# Patient Record
Sex: FEMALE | Race: WHITE | NOT HISPANIC OR LATINO | Employment: OTHER | URBAN - METROPOLITAN AREA
[De-identification: names, ages, dates, MRNs, and addresses within clinical notes are randomized per-mention and may not be internally consistent; named-entity substitution may affect disease eponyms.]

---

## 2023-02-13 PROBLEM — J22 ACUTE RESPIRATORY INFECTION: Status: ACTIVE | Noted: 2023-02-13

## 2023-02-13 PROBLEM — E03.9 HYPOTHYROIDISM: Status: ACTIVE | Noted: 2023-02-13

## 2023-02-13 PROBLEM — H52.31 ANISOMETROPIA: Status: ACTIVE | Noted: 2023-02-13

## 2023-02-13 PROBLEM — M16.12 PRIMARY OSTEOARTHRITIS OF LEFT HIP: Status: ACTIVE | Noted: 2023-02-13

## 2023-02-13 PROBLEM — H52.13 BILATERAL MYOPIA: Status: ACTIVE | Noted: 2023-02-13

## 2023-02-13 PROBLEM — I10 BENIGN ESSENTIAL HYPERTENSION: Status: ACTIVE | Noted: 2023-02-13

## 2023-02-13 PROBLEM — N60.09 BREAST CYST: Status: ACTIVE | Noted: 2023-02-13

## 2023-02-13 PROBLEM — R20.2 NUMBNESS AND TINGLING IN LEFT ARM: Status: ACTIVE | Noted: 2023-02-13

## 2023-02-13 PROBLEM — H92.09 OTALGIA: Status: ACTIVE | Noted: 2023-02-13

## 2023-02-13 PROBLEM — R92.8 MAMMOGRAM ABNORMAL: Status: ACTIVE | Noted: 2023-02-13

## 2023-02-13 PROBLEM — H52.203 ASTIGMATISM, BILATERAL: Status: ACTIVE | Noted: 2023-02-13

## 2023-02-13 PROBLEM — D58.2 ELEVATED HEMOGLOBIN (CMS-HCC): Status: ACTIVE | Noted: 2023-02-13

## 2023-02-13 PROBLEM — M25.552 LEFT HIP PAIN: Status: ACTIVE | Noted: 2023-02-13

## 2023-02-13 PROBLEM — N39.0 ACUTE UTI: Status: ACTIVE | Noted: 2023-02-13

## 2023-02-13 PROBLEM — E78.5 HYPERLIPIDEMIA: Status: ACTIVE | Noted: 2023-02-13

## 2023-02-13 PROBLEM — H43.813 PVD (POSTERIOR VITREOUS DETACHMENT), BOTH EYES: Status: ACTIVE | Noted: 2023-02-13

## 2023-02-13 PROBLEM — H52.4 BILATERAL PRESBYOPIA: Status: ACTIVE | Noted: 2023-02-13

## 2023-02-13 PROBLEM — R20.0 NUMBNESS AND TINGLING IN LEFT ARM: Status: ACTIVE | Noted: 2023-02-13

## 2023-02-13 PROBLEM — H25.13 NUCLEAR SCLEROSIS OF BOTH EYES: Status: ACTIVE | Noted: 2023-02-13

## 2023-02-13 PROBLEM — R71.8 ELEVATED HEMATOCRIT: Status: ACTIVE | Noted: 2023-02-13

## 2023-02-13 PROBLEM — M85.80 OSTEOPENIA: Status: ACTIVE | Noted: 2023-02-13

## 2023-02-13 RX ORDER — ATORVASTATIN CALCIUM 20 MG/1
1 TABLET, FILM COATED ORAL DAILY
COMMUNITY
Start: 2017-05-19 | End: 2023-03-09 | Stop reason: SDUPTHER

## 2023-02-13 RX ORDER — HYDROCHLOROTHIAZIDE 50 MG/1
1 TABLET ORAL DAILY
COMMUNITY
Start: 2013-01-30 | End: 2023-03-24 | Stop reason: SDUPTHER

## 2023-02-13 RX ORDER — ASPIRIN 81 MG/1
81 TABLET ORAL DAILY
COMMUNITY
Start: 2021-11-29 | End: 2023-03-24

## 2023-02-13 RX ORDER — LEVOTHYROXINE SODIUM 75 UG/1
1 TABLET ORAL DAILY
COMMUNITY
Start: 2013-05-09 | End: 2023-03-24 | Stop reason: SDUPTHER

## 2023-02-13 RX ORDER — GLUCOSAM/CHONDRO/HERB 149/HYAL 750-100 MG
1 TABLET ORAL 2 TIMES DAILY
COMMUNITY
End: 2023-03-24

## 2023-02-13 RX ORDER — LISINOPRIL 20 MG/1
1 TABLET ORAL DAILY
COMMUNITY
Start: 2020-01-06 | End: 2023-03-24 | Stop reason: SDUPTHER

## 2023-03-09 ENCOUNTER — TELEPHONE (OUTPATIENT)
Dept: PRIMARY CARE | Facility: CLINIC | Age: 68
End: 2023-03-09
Payer: MEDICARE

## 2023-03-09 DIAGNOSIS — E78.5 HYPERLIPIDEMIA, UNSPECIFIED HYPERLIPIDEMIA TYPE: Primary | ICD-10-CM

## 2023-03-09 RX ORDER — NITROFURANTOIN 25; 75 MG/1; MG/1
100 CAPSULE ORAL EVERY 12 HOURS
COMMUNITY
Start: 2022-11-21 | End: 2023-03-24 | Stop reason: ALTCHOICE

## 2023-03-09 RX ORDER — ATORVASTATIN CALCIUM 20 MG/1
20 TABLET, FILM COATED ORAL DAILY
Qty: 90 TABLET | Refills: 3 | Status: SHIPPED | OUTPATIENT
Start: 2023-03-09 | End: 2023-03-24 | Stop reason: SDUPTHER

## 2023-03-21 ENCOUNTER — LAB (OUTPATIENT)
Dept: LAB | Facility: LAB | Age: 68
End: 2023-03-21
Payer: MEDICARE

## 2023-03-21 DIAGNOSIS — E78.5 HYPERLIPIDEMIA, UNSPECIFIED HYPERLIPIDEMIA TYPE: ICD-10-CM

## 2023-03-21 LAB
CHOLESTEROL (MG/DL) IN SER/PLAS: 220 MG/DL (ref 0–199)
CHOLESTEROL IN HDL (MG/DL) IN SER/PLAS: 44.2 MG/DL
CHOLESTEROL/HDL RATIO: 5
LDL: 103 MG/DL (ref 0–99)
NON HDL CHOLESTEROL: 176 MG/DL
TRIGLYCERIDE (MG/DL) IN SER/PLAS: 362 MG/DL (ref 0–149)
VLDL: 72 MG/DL (ref 0–40)

## 2023-03-21 PROCEDURE — 36415 COLL VENOUS BLD VENIPUNCTURE: CPT

## 2023-03-21 PROCEDURE — 80061 LIPID PANEL: CPT

## 2023-03-24 ENCOUNTER — OFFICE VISIT (OUTPATIENT)
Dept: PRIMARY CARE | Facility: CLINIC | Age: 68
End: 2023-03-24
Payer: MEDICARE

## 2023-03-24 VITALS
WEIGHT: 135 LBS | HEIGHT: 64 IN | SYSTOLIC BLOOD PRESSURE: 114 MMHG | BODY MASS INDEX: 23.05 KG/M2 | OXYGEN SATURATION: 95 % | TEMPERATURE: 97.3 F | DIASTOLIC BLOOD PRESSURE: 79 MMHG | HEART RATE: 99 BPM

## 2023-03-24 DIAGNOSIS — E03.9 ACQUIRED HYPOTHYROIDISM: ICD-10-CM

## 2023-03-24 DIAGNOSIS — I10 BENIGN ESSENTIAL HYPERTENSION: Primary | ICD-10-CM

## 2023-03-24 DIAGNOSIS — M85.80 OSTEOPENIA, UNSPECIFIED LOCATION: ICD-10-CM

## 2023-03-24 DIAGNOSIS — E78.5 HYPERLIPIDEMIA, UNSPECIFIED HYPERLIPIDEMIA TYPE: ICD-10-CM

## 2023-03-24 DIAGNOSIS — D58.2 ELEVATED HEMOGLOBIN (CMS-HCC): ICD-10-CM

## 2023-03-24 PROCEDURE — 3074F SYST BP LT 130 MM HG: CPT | Performed by: INTERNAL MEDICINE

## 2023-03-24 PROCEDURE — 1159F MED LIST DOCD IN RCRD: CPT | Performed by: INTERNAL MEDICINE

## 2023-03-24 PROCEDURE — 1160F RVW MEDS BY RX/DR IN RCRD: CPT | Performed by: INTERNAL MEDICINE

## 2023-03-24 PROCEDURE — 99214 OFFICE O/P EST MOD 30 MIN: CPT | Performed by: INTERNAL MEDICINE

## 2023-03-24 PROCEDURE — 1036F TOBACCO NON-USER: CPT | Performed by: INTERNAL MEDICINE

## 2023-03-24 PROCEDURE — 3078F DIAST BP <80 MM HG: CPT | Performed by: INTERNAL MEDICINE

## 2023-03-24 RX ORDER — HYDROCHLOROTHIAZIDE 50 MG/1
50 TABLET ORAL DAILY
Qty: 90 TABLET | Refills: 3 | Status: SHIPPED | OUTPATIENT
Start: 2023-03-24 | End: 2024-02-26

## 2023-03-24 RX ORDER — LISINOPRIL 20 MG/1
20 TABLET ORAL DAILY
Qty: 90 TABLET | Refills: 3 | Status: SHIPPED | OUTPATIENT
Start: 2023-03-24 | End: 2024-02-26

## 2023-03-24 RX ORDER — LEVOTHYROXINE SODIUM 75 UG/1
75 TABLET ORAL
Qty: 90 TABLET | Refills: 3 | Status: SHIPPED | OUTPATIENT
Start: 2023-03-24 | End: 2024-02-26

## 2023-03-24 RX ORDER — ATORVASTATIN CALCIUM 20 MG/1
20 TABLET, FILM COATED ORAL DAILY
Qty: 90 TABLET | Refills: 3 | Status: SHIPPED | OUTPATIENT
Start: 2023-03-24 | End: 2024-02-26

## 2023-03-24 ASSESSMENT — PATIENT HEALTH QUESTIONNAIRE - PHQ9
2. FEELING DOWN, DEPRESSED OR HOPELESS: NOT AT ALL
SUM OF ALL RESPONSES TO PHQ9 QUESTIONS 1 AND 2: 0
1. LITTLE INTEREST OR PLEASURE IN DOING THINGS: NOT AT ALL

## 2023-03-24 NOTE — PROGRESS NOTES
"Subjective   Patient ID: Leny Dumont is a 67 y.o. female who presents for Follow-up.    here to F/u on medical problems HTN,HLD,hypothyroid,elevated glucose ,hemoglobin(seen by hematologist Dr Montes),she takes meds regularly,no side effects.  Her to go over her lab results,she has been watching her diet  She broke her left ankle in past,s/p surgery.           Review of Systems   Constitutional: Negative.    HENT: Negative.     Eyes: Negative.    Respiratory: Negative.     Cardiovascular: Negative.    Gastrointestinal: Negative.    Genitourinary: Negative.    Neurological: Negative.    Hematological: Negative.    Psychiatric/Behavioral: Negative.         Objective   /79 (BP Location: Right arm, Patient Position: Sitting, BP Cuff Size: Adult)   Pulse 99   Temp 36.3 °C (97.3 °F) (Temporal)   Ht 1.613 m (5' 3.5\")   Wt 61.2 kg (135 lb)   SpO2 95%   BMI 23.54 kg/m²     Physical Exam  Vitals reviewed.   Constitutional:       Appearance: Normal appearance.   HENT:      Head: Normocephalic and atraumatic.      Right Ear: Tympanic membrane and ear canal normal.      Left Ear: Tympanic membrane and ear canal normal.   Eyes:      Extraocular Movements: Extraocular movements intact.      Pupils: Pupils are equal, round, and reactive to light.   Cardiovascular:      Rate and Rhythm: Normal rate and regular rhythm.      Pulses: Normal pulses.      Heart sounds: Normal heart sounds. No murmur heard.  Pulmonary:      Effort: Pulmonary effort is normal.      Breath sounds: Normal breath sounds.   Abdominal:      General: Abdomen is flat. Bowel sounds are normal.      Palpations: Abdomen is soft.   Musculoskeletal:         General: Normal range of motion.      Cervical back: Normal range of motion and neck supple.   Neurological:      General: No focal deficit present.      Mental Status: She is alert and oriented to person, place, and time.   Psychiatric:         Mood and Affect: Mood normal.         Assessment/Plan "

## 2023-03-26 PROBLEM — N39.0 ACUTE UTI: Status: RESOLVED | Noted: 2023-02-13 | Resolved: 2023-03-26

## 2023-03-26 PROBLEM — J22 ACUTE RESPIRATORY INFECTION: Status: RESOLVED | Noted: 2023-02-13 | Resolved: 2023-03-26

## 2023-03-26 PROBLEM — H92.09 OTALGIA: Status: RESOLVED | Noted: 2023-02-13 | Resolved: 2023-03-26

## 2023-03-26 ASSESSMENT — ENCOUNTER SYMPTOMS
CARDIOVASCULAR NEGATIVE: 1
EYES NEGATIVE: 1
PSYCHIATRIC NEGATIVE: 1
HEMATOLOGIC/LYMPHATIC NEGATIVE: 1
RESPIRATORY NEGATIVE: 1
NEUROLOGICAL NEGATIVE: 1
CONSTITUTIONAL NEGATIVE: 1
GASTROINTESTINAL NEGATIVE: 1

## 2023-03-26 NOTE — PATIENT INSTRUCTIONS
Continue same meds.  Return for folow up in 4 months for wellness.  Order labs.  Stable medical conditions.

## 2023-07-18 PROBLEM — M25.672 DECREASED RANGE OF MOTION OF LEFT ANKLE: Status: ACTIVE | Noted: 2021-11-30

## 2023-07-18 PROBLEM — R26.9 GAIT ABNORMALITY: Status: ACTIVE | Noted: 2021-11-30

## 2023-07-18 PROBLEM — I10 HTN (HYPERTENSION): Status: ACTIVE | Noted: 2021-11-04

## 2023-07-18 PROBLEM — S82.852A CLOSED TRIMALLEOLAR FRACTURE OF LEFT ANKLE: Status: ACTIVE | Noted: 2021-11-04

## 2023-07-21 ENCOUNTER — LAB (OUTPATIENT)
Dept: LAB | Facility: LAB | Age: 68
End: 2023-07-21
Payer: MEDICARE

## 2023-07-21 DIAGNOSIS — I10 BENIGN ESSENTIAL HYPERTENSION: ICD-10-CM

## 2023-07-21 DIAGNOSIS — E78.5 HYPERLIPIDEMIA, UNSPECIFIED HYPERLIPIDEMIA TYPE: ICD-10-CM

## 2023-07-21 LAB
ALANINE AMINOTRANSFERASE (SGPT) (U/L) IN SER/PLAS: 21 U/L (ref 7–45)
ALBUMIN (G/DL) IN SER/PLAS: 4.5 G/DL (ref 3.4–5)
ALKALINE PHOSPHATASE (U/L) IN SER/PLAS: 87 U/L (ref 33–136)
ANION GAP IN SER/PLAS: 14 MMOL/L (ref 10–20)
ASPARTATE AMINOTRANSFERASE (SGOT) (U/L) IN SER/PLAS: 22 U/L (ref 9–39)
BASOPHILS (10*3/UL) IN BLOOD BY AUTOMATED COUNT: 0.04 X10E9/L (ref 0–0.1)
BASOPHILS/100 LEUKOCYTES IN BLOOD BY AUTOMATED COUNT: 0.6 % (ref 0–2)
BILIRUBIN TOTAL (MG/DL) IN SER/PLAS: 0.9 MG/DL (ref 0–1.2)
CALCIUM (MG/DL) IN SER/PLAS: 10.3 MG/DL (ref 8.6–10.3)
CARBON DIOXIDE, TOTAL (MMOL/L) IN SER/PLAS: 30 MMOL/L (ref 21–32)
CHLORIDE (MMOL/L) IN SER/PLAS: 97 MMOL/L (ref 98–107)
CHOLESTEROL (MG/DL) IN SER/PLAS: 223 MG/DL (ref 0–199)
CHOLESTEROL IN HDL (MG/DL) IN SER/PLAS: 51.6 MG/DL
CHOLESTEROL/HDL RATIO: 4.3
CREATININE (MG/DL) IN SER/PLAS: 0.68 MG/DL (ref 0.5–1.05)
EOSINOPHILS (10*3/UL) IN BLOOD BY AUTOMATED COUNT: 0.1 X10E9/L (ref 0–0.7)
EOSINOPHILS/100 LEUKOCYTES IN BLOOD BY AUTOMATED COUNT: 1.5 % (ref 0–6)
ERYTHROCYTE DISTRIBUTION WIDTH (RATIO) BY AUTOMATED COUNT: 13.2 % (ref 11.5–14.5)
ERYTHROCYTE MEAN CORPUSCULAR HEMOGLOBIN CONCENTRATION (G/DL) BY AUTOMATED: 32.2 G/DL (ref 32–36)
ERYTHROCYTE MEAN CORPUSCULAR VOLUME (FL) BY AUTOMATED COUNT: 94 FL (ref 80–100)
ERYTHROCYTES (10*6/UL) IN BLOOD BY AUTOMATED COUNT: 5.37 X10E12/L (ref 4–5.2)
GFR FEMALE: >90 ML/MIN/1.73M2
GLUCOSE (MG/DL) IN SER/PLAS: 117 MG/DL (ref 74–99)
HEMATOCRIT (%) IN BLOOD BY AUTOMATED COUNT: 50.3 % (ref 36–46)
HEMOGLOBIN (G/DL) IN BLOOD: 16.2 G/DL (ref 12–16)
IMMATURE GRANULOCYTES/100 LEUKOCYTES IN BLOOD BY AUTOMATED COUNT: 0.3 % (ref 0–0.9)
LDL: 119 MG/DL (ref 0–99)
LEUKOCYTES (10*3/UL) IN BLOOD BY AUTOMATED COUNT: 6.8 X10E9/L (ref 4.4–11.3)
LYMPHOCYTES (10*3/UL) IN BLOOD BY AUTOMATED COUNT: 1.72 X10E9/L (ref 1.2–4.8)
LYMPHOCYTES/100 LEUKOCYTES IN BLOOD BY AUTOMATED COUNT: 25.3 % (ref 13–44)
MONOCYTES (10*3/UL) IN BLOOD BY AUTOMATED COUNT: 0.56 X10E9/L (ref 0.1–1)
MONOCYTES/100 LEUKOCYTES IN BLOOD BY AUTOMATED COUNT: 8.2 % (ref 2–10)
NEUTROPHILS (10*3/UL) IN BLOOD BY AUTOMATED COUNT: 4.36 X10E9/L (ref 1.2–7.7)
NEUTROPHILS/100 LEUKOCYTES IN BLOOD BY AUTOMATED COUNT: 64.1 % (ref 40–80)
NON HDL CHOLESTEROL: 171 MG/DL
PLATELETS (10*3/UL) IN BLOOD AUTOMATED COUNT: 313 X10E9/L (ref 150–450)
POTASSIUM (MMOL/L) IN SER/PLAS: 4.3 MMOL/L (ref 3.5–5.3)
PROTEIN TOTAL: 7.9 G/DL (ref 6.4–8.2)
SODIUM (MMOL/L) IN SER/PLAS: 137 MMOL/L (ref 136–145)
THYROTROPIN (MIU/L) IN SER/PLAS BY DETECTION LIMIT <= 0.05 MIU/L: 1.71 MIU/L (ref 0.44–3.98)
TRIGLYCERIDE (MG/DL) IN SER/PLAS: 260 MG/DL (ref 0–149)
UREA NITROGEN (MG/DL) IN SER/PLAS: 18 MG/DL (ref 6–23)
VLDL: 52 MG/DL (ref 0–40)

## 2023-07-21 PROCEDURE — 80061 LIPID PANEL: CPT

## 2023-07-21 PROCEDURE — 80053 COMPREHEN METABOLIC PANEL: CPT

## 2023-07-21 PROCEDURE — 84443 ASSAY THYROID STIM HORMONE: CPT

## 2023-07-21 PROCEDURE — 36415 COLL VENOUS BLD VENIPUNCTURE: CPT

## 2023-07-21 PROCEDURE — 85025 COMPLETE CBC W/AUTO DIFF WBC: CPT

## 2023-07-25 ENCOUNTER — OFFICE VISIT (OUTPATIENT)
Dept: PRIMARY CARE | Facility: CLINIC | Age: 68
End: 2023-07-25
Payer: MEDICARE

## 2023-07-25 VITALS
HEIGHT: 63 IN | SYSTOLIC BLOOD PRESSURE: 132 MMHG | TEMPERATURE: 98.6 F | WEIGHT: 131.8 LBS | HEART RATE: 99 BPM | BODY MASS INDEX: 23.35 KG/M2 | DIASTOLIC BLOOD PRESSURE: 83 MMHG | OXYGEN SATURATION: 95 %

## 2023-07-25 DIAGNOSIS — E03.9 ACQUIRED HYPOTHYROIDISM: ICD-10-CM

## 2023-07-25 DIAGNOSIS — Z00.00 MEDICARE ANNUAL WELLNESS VISIT, SUBSEQUENT: ICD-10-CM

## 2023-07-25 DIAGNOSIS — Z12.31 OTHER SCREENING MAMMOGRAM: ICD-10-CM

## 2023-07-25 DIAGNOSIS — Z00.00 ROUTINE GENERAL MEDICAL EXAMINATION AT HEALTH CARE FACILITY: Primary | ICD-10-CM

## 2023-07-25 DIAGNOSIS — Z78.0 ASYMPTOMATIC MENOPAUSE: ICD-10-CM

## 2023-07-25 DIAGNOSIS — Z00.00 ANNUAL PHYSICAL EXAM: ICD-10-CM

## 2023-07-25 DIAGNOSIS — S82.852S CLOSED TRIMALLEOLAR FRACTURE OF LEFT ANKLE, SEQUELA: ICD-10-CM

## 2023-07-25 DIAGNOSIS — R73.9 BLOOD GLUCOSE ELEVATED: ICD-10-CM

## 2023-07-25 DIAGNOSIS — E78.5 HYPERLIPIDEMIA, UNSPECIFIED HYPERLIPIDEMIA TYPE: ICD-10-CM

## 2023-07-25 DIAGNOSIS — I10 BENIGN ESSENTIAL HYPERTENSION: ICD-10-CM

## 2023-07-25 PROBLEM — R92.8 MAMMOGRAM ABNORMAL: Status: RESOLVED | Noted: 2023-02-13 | Resolved: 2023-07-25

## 2023-07-25 PROBLEM — R20.2 NUMBNESS AND TINGLING IN LEFT ARM: Status: RESOLVED | Noted: 2023-02-13 | Resolved: 2023-07-25

## 2023-07-25 PROBLEM — R20.0 NUMBNESS AND TINGLING IN LEFT ARM: Status: RESOLVED | Noted: 2023-02-13 | Resolved: 2023-07-25

## 2023-07-25 PROBLEM — N60.09 BREAST CYST: Status: RESOLVED | Noted: 2023-02-13 | Resolved: 2023-07-25

## 2023-07-25 PROBLEM — H52.4 BILATERAL PRESBYOPIA: Status: RESOLVED | Noted: 2023-02-13 | Resolved: 2023-07-25

## 2023-07-25 PROCEDURE — 3075F SYST BP GE 130 - 139MM HG: CPT | Performed by: INTERNAL MEDICINE

## 2023-07-25 PROCEDURE — 93000 ELECTROCARDIOGRAM COMPLETE: CPT | Performed by: INTERNAL MEDICINE

## 2023-07-25 PROCEDURE — 1160F RVW MEDS BY RX/DR IN RCRD: CPT | Performed by: INTERNAL MEDICINE

## 2023-07-25 PROCEDURE — G0444 DEPRESSION SCREEN ANNUAL: HCPCS | Performed by: INTERNAL MEDICINE

## 2023-07-25 PROCEDURE — G0442 ANNUAL ALCOHOL SCREEN 15 MIN: HCPCS | Performed by: INTERNAL MEDICINE

## 2023-07-25 PROCEDURE — 3079F DIAST BP 80-89 MM HG: CPT | Performed by: INTERNAL MEDICINE

## 2023-07-25 PROCEDURE — 1170F FXNL STATUS ASSESSED: CPT | Performed by: INTERNAL MEDICINE

## 2023-07-25 PROCEDURE — 1036F TOBACCO NON-USER: CPT | Performed by: INTERNAL MEDICINE

## 2023-07-25 PROCEDURE — 99213 OFFICE O/P EST LOW 20 MIN: CPT | Performed by: INTERNAL MEDICINE

## 2023-07-25 PROCEDURE — 99397 PER PM REEVAL EST PAT 65+ YR: CPT | Performed by: INTERNAL MEDICINE

## 2023-07-25 PROCEDURE — G0439 PPPS, SUBSEQ VISIT: HCPCS | Performed by: INTERNAL MEDICINE

## 2023-07-25 PROCEDURE — 1159F MED LIST DOCD IN RCRD: CPT | Performed by: INTERNAL MEDICINE

## 2023-07-25 ASSESSMENT — ACTIVITIES OF DAILY LIVING (ADL)
TAKING_MEDICATION: INDEPENDENT
MANAGING_FINANCES: INDEPENDENT
DRESSING: INDEPENDENT
GROCERY_SHOPPING: INDEPENDENT
BATHING: INDEPENDENT
DOING_HOUSEWORK: INDEPENDENT

## 2023-07-25 ASSESSMENT — PATIENT HEALTH QUESTIONNAIRE - PHQ9
SUM OF ALL RESPONSES TO PHQ9 QUESTIONS 1 AND 2: 0
2. FEELING DOWN, DEPRESSED OR HOPELESS: NOT AT ALL
1. LITTLE INTEREST OR PLEASURE IN DOING THINGS: NOT AT ALL

## 2023-07-25 NOTE — PROGRESS NOTES
"Subjective   Reason for Visit: Leny Dumont is an 67 y.o. female here for a Medicare Wellness visit.     Past Medical, Surgical, and Family History reviewed and updated in chart.    Reviewed all medications by prescribing practitioner or clinical pharmacist (such as prescriptions, OTCs, herbal therapies and supplements) and documented in the medical record.    Here for MCR,CPE and  to F/u on medical problems HTN,HLD,hypothyroid,elevated glucose ,hemoglobin(seen by hematologist Dr Montes),she takes meds regularly,no side effects.  Her to go over her lab results,she has been watching her diet  She had h/o  left ankle trimalleolar Fx in past in 2021,s/p surgery.still has pain at times depend on the weather.          Patient Care Team:  Neva Tang MD as PCP - General  Neva Tang MD as PCP - [employer] PCP     Review of Systems   Constitutional: Negative.    HENT: Negative.     Eyes: Negative.    Respiratory: Negative.     Cardiovascular: Negative.    Gastrointestinal: Negative.    Genitourinary: Negative.    Musculoskeletal:         Left ankle pain.   Neurological: Negative.    Hematological: Negative.    Psychiatric/Behavioral: Negative.         Objective   Vitals:  /83 (BP Location: Left arm, Patient Position: Sitting, BP Cuff Size: Adult)   Pulse 99   Temp 37 °C (98.6 °F) (Temporal)   Ht 1.6 m (5' 3\")   Wt 59.8 kg (131 lb 12.8 oz)   SpO2 95%   BMI 23.35 kg/m²       Physical Exam  Constitutional:       General: She is not in acute distress.     Appearance: Normal appearance.   HENT:      Head: Normocephalic and atraumatic.      Right Ear: Tympanic membrane, ear canal and external ear normal.      Left Ear: Tympanic membrane, ear canal and external ear normal.      Mouth/Throat:      Mouth: Mucous membranes are moist.      Pharynx: No oropharyngeal exudate.   Eyes:      Extraocular Movements: Extraocular movements intact.      Pupils: Pupils are equal, round, and reactive to " light.   Neck:      Vascular: No carotid bruit.   Cardiovascular:      Rate and Rhythm: Normal rate and regular rhythm.      Heart sounds: Normal heart sounds. No murmur heard.  Pulmonary:      Effort: Pulmonary effort is normal.      Breath sounds: Normal breath sounds. No wheezing or rhonchi.   Chest:   Breasts:     Right: Normal.      Left: Normal.   Abdominal:      General: Abdomen is flat. Bowel sounds are normal. There is no distension.      Palpations: Abdomen is soft.   Musculoskeletal:         General: Normal range of motion.      Cervical back: Normal range of motion and neck supple.      Right lower leg: No edema.      Left lower leg: No edema.      Comments: Healed scar left ankle,limited ROM.   Lymphadenopathy:      Cervical: No cervical adenopathy.      Upper Body:      Right upper body: No supraclavicular or axillary adenopathy.      Left upper body: No supraclavicular or axillary adenopathy.   Skin:     General: Skin is warm.   Neurological:      General: No focal deficit present.      Mental Status: She is alert and oriented to person, place, and time.   Psychiatric:         Mood and Affect: Mood normal.         Behavior: Behavior normal.         Assessment/Plan   Problem List Items Addressed This Visit       Benign essential hypertension    Current Assessment & Plan     Stable on current med.         Relevant Orders    ECG 12 lead (Clinic Performed) (Completed)    Hyperlipidemia    Current Assessment & Plan     Stable on statin.         Relevant Orders    ECG 12 lead (Clinic Performed) (Completed)    Lipid panel    Hypothyroidism    Current Assessment & Plan     Stable on Levothyroxine.         Closed trimalleolar fracture of left ankle    Medicare annual wellness visit, subsequent    Current Assessment & Plan     Order mamogram,DEXA,lab.  Review immunizations.  Fall prevention.         Annual physical exam    Other screening mammogram    Relevant Orders    BI mammo bilateral screening  tomosynthesis    Asymptomatic menopause    Relevant Orders    XR DEXA bone density    Blood glucose elevated    Relevant Orders    Glucose, fasting    Hemoglobin A1c     Other Visit Diagnoses       Routine general medical examination at health care facility    -  Primary

## 2023-07-29 PROBLEM — Z78.0 ASYMPTOMATIC MENOPAUSE: Status: ACTIVE | Noted: 2023-07-29

## 2023-07-29 PROBLEM — Z00.00 ANNUAL PHYSICAL EXAM: Status: ACTIVE | Noted: 2023-07-29

## 2023-07-29 PROBLEM — Z12.31 OTHER SCREENING MAMMOGRAM: Status: ACTIVE | Noted: 2023-07-29

## 2023-07-29 PROBLEM — Z00.00 MEDICARE ANNUAL WELLNESS VISIT, SUBSEQUENT: Status: ACTIVE | Noted: 2023-07-29

## 2023-07-29 PROBLEM — R73.9 BLOOD GLUCOSE ELEVATED: Status: ACTIVE | Noted: 2023-07-29

## 2023-07-29 ASSESSMENT — ENCOUNTER SYMPTOMS
CARDIOVASCULAR NEGATIVE: 1
GASTROINTESTINAL NEGATIVE: 1
HEMATOLOGIC/LYMPHATIC NEGATIVE: 1
CONSTITUTIONAL NEGATIVE: 1
NEUROLOGICAL NEGATIVE: 1
RESPIRATORY NEGATIVE: 1
EYES NEGATIVE: 1
PSYCHIATRIC NEGATIVE: 1

## 2023-10-20 ENCOUNTER — APPOINTMENT (OUTPATIENT)
Dept: RADIOLOGY | Facility: CLINIC | Age: 68
End: 2023-10-20
Payer: MEDICARE

## 2023-11-08 ENCOUNTER — ANCILLARY PROCEDURE (OUTPATIENT)
Dept: RADIOLOGY | Facility: CLINIC | Age: 68
End: 2023-11-08
Payer: MEDICARE

## 2023-11-08 DIAGNOSIS — Z78.0 ASYMPTOMATIC MENOPAUSAL STATE: ICD-10-CM

## 2023-11-08 DIAGNOSIS — Z12.31 ENCOUNTER FOR SCREENING MAMMOGRAM FOR MALIGNANT NEOPLASM OF BREAST: ICD-10-CM

## 2023-11-08 PROCEDURE — 77080 DXA BONE DENSITY AXIAL: CPT | Performed by: STUDENT IN AN ORGANIZED HEALTH CARE EDUCATION/TRAINING PROGRAM

## 2023-11-08 PROCEDURE — 77063 BREAST TOMOSYNTHESIS BI: CPT

## 2023-11-08 PROCEDURE — 77067 SCR MAMMO BI INCL CAD: CPT

## 2023-11-08 PROCEDURE — 77067 SCR MAMMO BI INCL CAD: CPT | Mod: BILATERAL PROCEDURE | Performed by: RADIOLOGY

## 2023-11-08 PROCEDURE — 77080 DXA BONE DENSITY AXIAL: CPT

## 2023-11-08 PROCEDURE — 77063 BREAST TOMOSYNTHESIS BI: CPT | Mod: BILATERAL PROCEDURE | Performed by: RADIOLOGY

## 2023-11-09 NOTE — RESULT ENCOUNTER NOTE
Your bone density study showed osteopenia,take calcium 600 mg twice daily,vitamin D3 1000 IU daily and follow weight bearing exercise.

## 2023-11-21 ENCOUNTER — APPOINTMENT (OUTPATIENT)
Dept: PRIMARY CARE | Facility: CLINIC | Age: 68
End: 2023-11-21
Payer: MEDICARE

## 2023-12-04 ENCOUNTER — TELEPHONE (OUTPATIENT)
Dept: PRIMARY CARE | Facility: CLINIC | Age: 68
End: 2023-12-04
Payer: MEDICARE

## 2023-12-06 ENCOUNTER — LAB (OUTPATIENT)
Dept: LAB | Facility: LAB | Age: 68
End: 2023-12-06
Payer: MEDICARE

## 2023-12-06 DIAGNOSIS — R73.9 BLOOD GLUCOSE ELEVATED: ICD-10-CM

## 2023-12-06 LAB
EST. AVERAGE GLUCOSE BLD GHB EST-MCNC: 140 MG/DL
GLUCOSE P FAST SERPL-MCNC: 112 MG/DL (ref 74–99)
HBA1C MFR BLD: 6.5 %

## 2023-12-06 PROCEDURE — 83036 HEMOGLOBIN GLYCOSYLATED A1C: CPT

## 2023-12-06 PROCEDURE — 36415 COLL VENOUS BLD VENIPUNCTURE: CPT

## 2023-12-06 PROCEDURE — 82947 ASSAY GLUCOSE BLOOD QUANT: CPT

## 2023-12-07 NOTE — RESULT ENCOUNTER NOTE
A1c 6.5, in the diabetes range we will discuss at upcoming appointment,.  Please follow low carbohydrate diet and regular exercise.

## 2023-12-08 ASSESSMENT — ENCOUNTER SYMPTOMS
BLURRED VISION: 0
TREMORS: 0
SEIZURES: 0
WEIGHT LOSS: 0
BLACKOUTS: 0
FATIGUE: 0
SPEECH DIFFICULTY: 0
POLYDIPSIA: 0
DIZZINESS: 0
VISUAL CHANGE: 0
WEAKNESS: 0
CONFUSION: 0
SWEATS: 0
HUNGER: 0
NERVOUS/ANXIOUS: 0
HEADACHES: 0
POLYPHAGIA: 0

## 2023-12-13 ENCOUNTER — TELEMEDICINE (OUTPATIENT)
Dept: PRIMARY CARE | Facility: CLINIC | Age: 68
End: 2023-12-13
Payer: MEDICARE

## 2023-12-13 VITALS — DIASTOLIC BLOOD PRESSURE: 70 MMHG | SYSTOLIC BLOOD PRESSURE: 117 MMHG

## 2023-12-13 DIAGNOSIS — D58.2 ELEVATED HEMOGLOBIN (CMS-HCC): ICD-10-CM

## 2023-12-13 DIAGNOSIS — R71.8 ELEVATED HEMATOCRIT: ICD-10-CM

## 2023-12-13 DIAGNOSIS — I10 PRIMARY HYPERTENSION: ICD-10-CM

## 2023-12-13 DIAGNOSIS — E11.9 DIABETES MELLITUS WITHOUT COMPLICATION (MULTI): Primary | ICD-10-CM

## 2023-12-13 PROBLEM — H43.813 PVD (POSTERIOR VITREOUS DETACHMENT), BOTH EYES: Status: RESOLVED | Noted: 2023-02-13 | Resolved: 2023-12-13

## 2023-12-13 PROCEDURE — 99213 OFFICE O/P EST LOW 20 MIN: CPT | Performed by: INTERNAL MEDICINE

## 2023-12-13 RX ORDER — METFORMIN HYDROCHLORIDE 500 MG/1
500 TABLET, EXTENDED RELEASE ORAL
Qty: 60 TABLET | Refills: 2 | Status: SHIPPED | OUTPATIENT
Start: 2023-12-13 | End: 2024-12-12

## 2023-12-13 ASSESSMENT — ENCOUNTER SYMPTOMS
SPEECH DIFFICULTY: 0
DIZZINESS: 0
CONFUSION: 0
WEAKNESS: 0
BLURRED VISION: 0
SWEATS: 0
HUNGER: 0
TREMORS: 0
NERVOUS/ANXIOUS: 0
WEIGHT LOSS: 0
POLYPHAGIA: 0
VISUAL CHANGE: 0
POLYDIPSIA: 0
FATIGUE: 0
HEADACHES: 0
BLACKOUTS: 0
SEIZURES: 0

## 2023-12-13 ASSESSMENT — PATIENT HEALTH QUESTIONNAIRE - PHQ9
1. LITTLE INTEREST OR PLEASURE IN DOING THINGS: NOT AT ALL
2. FEELING DOWN, DEPRESSED OR HOPELESS: NOT AT ALL
SUM OF ALL RESPONSES TO PHQ9 QUESTIONS 1 AND 2: 0

## 2023-12-13 NOTE — ASSESSMENT & PLAN NOTE
Continue 1800-calorie ADA and exercise.  Start metformin 500 mg 1 daily can increase to 2 a day and follow-up with me in 3 months at that time we will do Accu-Chek and A1c.

## 2023-12-13 NOTE — PROGRESS NOTES
Subjective   Patient ID: Leny Dumont is a 68 y.o. female who presents for Follow-up (Follow up on A1C results. ).    An interactive audio and video telecommunication system with patient real time communications between the patient (at the original site) and provider (at the distant site) was utilized to provide this telehealth service.  Verbal consent was requested and obtained from the patient at this date for a telehealth.  Pt seen virtually (due to respiratory infection for 8 days but it is getting better her COVID test was negative )for  F/u on medical problems HTN,HLD,hypothyroid,elevated glucose ,hemoglobin(seen by hematologist Dr Montes),she takes meds regularly,no side effects.  She has been watching low-carb diet and regular exercise she walks her dog daily.  Her to go over her lab results,she has been watching her diet, she found out that her mother had diabetes and was taking metformin for many years.  She had her flu vaccine and COVID booster at the pharmacy, I did recommend for her to get the RSV vaccine after she gets over the current respiratory infection.    Diabetes  She has type 2 diabetes mellitus. No MedicAlert identification noted. The initial diagnosis of diabetes was made 1 days ago. Pertinent negatives for hypoglycemia include no confusion, dizziness, headaches, hunger, mood changes, nervousness/anxiousness, pallor, seizures, sleepiness, speech difficulty, sweats or tremors. Pertinent negatives for diabetes include no blurred vision, no chest pain, no fatigue, no foot paresthesias, no foot ulcerations, no polydipsia, no polyphagia, no polyuria, no visual change, no weakness and no weight loss. Pertinent negatives for hypoglycemia complications include no blackouts, no hospitalization, no nocturnal hypoglycemia, no required assistance and no required glucagon injection. Symptoms are stable. Pertinent negatives for diabetic complications include no CVA, heart disease, impotence, nephropathy,  peripheral neuropathy, PVD or retinopathy. Risk factors for coronary artery disease include dyslipidemia, hypertension and post-menopausal. Current diabetic treatment includes diet. She is compliant with treatment most of the time. Her weight is stable. She is following a generally healthy diet. Meal planning includes avoidance of concentrated sweets and calorie counting. She has not had a previous visit with a dietitian. She participates in exercise daily. There is no compliance with monitoring of blood glucose. She does not see a podiatrist.Eye exam is current.        Review of Systems   Constitutional:  Negative for fatigue and weight loss.   Eyes:  Negative for blurred vision.   Cardiovascular:  Negative for chest pain.   Endocrine: Negative for polydipsia, polyphagia and polyuria.   Genitourinary:  Negative for impotence.   Skin:  Negative for pallor.   Neurological:  Negative for dizziness, tremors, seizures, speech difficulty, weakness and headaches.   Psychiatric/Behavioral:  Negative for confusion. The patient is not nervous/anxious.        Objective   /70     Physical Exam  Constitutional:       General: She is not in acute distress.  Pulmonary:      Effort: No respiratory distress.   Neurological:      Mental Status: She is alert.         Assessment/Plan   Problem List Items Addressed This Visit             ICD-10-CM    Elevated hematocrit R71.8    Elevated hemoglobin (CMS/HCC) D58.2     Seen by hematologist in past.         HTN (hypertension) I10     Stable on current medication average home blood pressure 117/70.         Diabetes mellitus without complication (CMS/HCC) - Primary E11.9     Continue 1800-calorie ADA and exercise.  Start metformin 500 mg 1 daily can increase to 2 a day and follow-up with me in 3 months at that time we will do Accu-Chek and A1c.         Relevant Medications    metFORMIN XR (Glucophage-XR) 500 mg 24 hr tablet

## 2024-02-25 DIAGNOSIS — I10 BENIGN ESSENTIAL HYPERTENSION: ICD-10-CM

## 2024-02-25 DIAGNOSIS — E78.5 HYPERLIPIDEMIA, UNSPECIFIED HYPERLIPIDEMIA TYPE: ICD-10-CM

## 2024-02-26 RX ORDER — LEVOTHYROXINE SODIUM 75 UG/1
75 TABLET ORAL
Qty: 90 TABLET | Refills: 3 | Status: SHIPPED | OUTPATIENT
Start: 2024-02-26

## 2024-02-26 RX ORDER — LISINOPRIL 20 MG/1
20 TABLET ORAL DAILY
Qty: 90 TABLET | Refills: 3 | Status: SHIPPED | OUTPATIENT
Start: 2024-02-26

## 2024-02-26 RX ORDER — ATORVASTATIN CALCIUM 20 MG/1
20 TABLET, FILM COATED ORAL DAILY
Qty: 90 TABLET | Refills: 3 | Status: SHIPPED | OUTPATIENT
Start: 2024-02-26

## 2024-02-26 RX ORDER — HYDROCHLOROTHIAZIDE 50 MG/1
50 TABLET ORAL DAILY
Qty: 90 TABLET | Refills: 3 | Status: SHIPPED | OUTPATIENT
Start: 2024-02-26

## 2024-03-25 ASSESSMENT — ENCOUNTER SYMPTOMS
SWEATS: 0
NERVOUS/ANXIOUS: 0
BLURRED VISION: 0
HUNGER: 0
VISUAL CHANGE: 0
FATIGUE: 0
WEIGHT LOSS: 0
WEAKNESS: 0
TREMORS: 0
HEADACHES: 0
DIZZINESS: 0
BLACKOUTS: 0
SPEECH DIFFICULTY: 0
SEIZURES: 0
POLYPHAGIA: 0
POLYDIPSIA: 0
CONFUSION: 0

## 2024-03-27 ENCOUNTER — OFFICE VISIT (OUTPATIENT)
Dept: PRIMARY CARE | Facility: CLINIC | Age: 69
End: 2024-03-27
Payer: MEDICARE

## 2024-03-27 VITALS
OXYGEN SATURATION: 96 % | BODY MASS INDEX: 23.31 KG/M2 | DIASTOLIC BLOOD PRESSURE: 70 MMHG | WEIGHT: 131.6 LBS | TEMPERATURE: 96.9 F | HEART RATE: 88 BPM | SYSTOLIC BLOOD PRESSURE: 126 MMHG

## 2024-03-27 DIAGNOSIS — I10 BENIGN ESSENTIAL HYPERTENSION: Primary | ICD-10-CM

## 2024-03-27 DIAGNOSIS — D58.2 ELEVATED HEMOGLOBIN (CMS-HCC): ICD-10-CM

## 2024-03-27 DIAGNOSIS — E78.5 HYPERLIPIDEMIA, UNSPECIFIED HYPERLIPIDEMIA TYPE: ICD-10-CM

## 2024-03-27 DIAGNOSIS — E11.9 DIABETES MELLITUS WITHOUT COMPLICATION (MULTI): ICD-10-CM

## 2024-03-27 DIAGNOSIS — E03.9 ACQUIRED HYPOTHYROIDISM: ICD-10-CM

## 2024-03-27 PROBLEM — R26.9 GAIT ABNORMALITY: Status: RESOLVED | Noted: 2021-11-30 | Resolved: 2024-03-27

## 2024-03-27 LAB
POC FINGERSTICK BLOOD GLUCOSE: 107 MG/DL (ref 70–100)
POC HEMOGLOBIN A1C: 6.2 % (ref 4.2–6.5)

## 2024-03-27 PROCEDURE — 1123F ACP DISCUSS/DSCN MKR DOCD: CPT | Performed by: INTERNAL MEDICINE

## 2024-03-27 PROCEDURE — 1160F RVW MEDS BY RX/DR IN RCRD: CPT | Performed by: INTERNAL MEDICINE

## 2024-03-27 PROCEDURE — 82962 GLUCOSE BLOOD TEST: CPT | Performed by: INTERNAL MEDICINE

## 2024-03-27 PROCEDURE — 3074F SYST BP LT 130 MM HG: CPT | Performed by: INTERNAL MEDICINE

## 2024-03-27 PROCEDURE — 83036 HEMOGLOBIN GLYCOSYLATED A1C: CPT | Performed by: INTERNAL MEDICINE

## 2024-03-27 PROCEDURE — 1158F ADVNC CARE PLAN TLK DOCD: CPT | Performed by: INTERNAL MEDICINE

## 2024-03-27 PROCEDURE — 3078F DIAST BP <80 MM HG: CPT | Performed by: INTERNAL MEDICINE

## 2024-03-27 PROCEDURE — 1036F TOBACCO NON-USER: CPT | Performed by: INTERNAL MEDICINE

## 2024-03-27 PROCEDURE — 99214 OFFICE O/P EST MOD 30 MIN: CPT | Performed by: INTERNAL MEDICINE

## 2024-03-27 PROCEDURE — 1159F MED LIST DOCD IN RCRD: CPT | Performed by: INTERNAL MEDICINE

## 2024-03-27 PROCEDURE — 4010F ACE/ARB THERAPY RXD/TAKEN: CPT | Performed by: INTERNAL MEDICINE

## 2024-03-27 ASSESSMENT — ENCOUNTER SYMPTOMS
BLACKOUTS: 0
HEADACHES: 0
PSYCHIATRIC NEGATIVE: 1
NEUROLOGICAL NEGATIVE: 1
CONSTITUTIONAL NEGATIVE: 1
TREMORS: 0
POLYDIPSIA: 0
SEIZURES: 0
POLYPHAGIA: 0
CARDIOVASCULAR NEGATIVE: 1
SPEECH DIFFICULTY: 0
WEAKNESS: 0
NERVOUS/ANXIOUS: 0
FATIGUE: 0
SWEATS: 0
WEIGHT LOSS: 0
HEMATOLOGIC/LYMPHATIC NEGATIVE: 1
BLURRED VISION: 0
EYES NEGATIVE: 1
HUNGER: 0
GASTROINTESTINAL NEGATIVE: 1
DIZZINESS: 0
RESPIRATORY NEGATIVE: 1
CONFUSION: 0
VISUAL CHANGE: 0

## 2024-03-27 NOTE — PROGRESS NOTES
Subjective   Patient ID: Leny Dumont is a 68 y.o. female who presents for Follow up on A1c .    Here to F/u on medical problems HTN,HLD,hypothyroid,elevated glucose ,elevated hemoglobin(seen in past by hematologist Dr Montes,told to monitor and had work up),she takes meds regularly,no side effects, she is tolerating metformin that was started last visit.  She did not change her eating habit but she continued to walk her dog regularly she noted she has been urinating less frequent as before or prior to starting metformin.  Eye exam within 1 year she was told she had cataract but not bad enough to be operated on.  Her to go over her lab results.  She had h/o  left ankle trimalleolar Fx in past in 2021,s/p surgery.still has pain at times depend on the weather.  She has a living will and advanced directive and her daughter is POA.      Diabetes  She has type 2 diabetes mellitus. No MedicAlert identification noted. The initial diagnosis of diabetes was made 3 months ago. Pertinent negatives for hypoglycemia include no confusion, dizziness, headaches, hunger, mood changes, nervousness/anxiousness, pallor, seizures, sleepiness, speech difficulty, sweats or tremors. Associated symptoms include foot paresthesias. Pertinent negatives for diabetes include no blurred vision, no chest pain, no fatigue, no foot ulcerations, no polydipsia, no polyphagia, no polyuria, no visual change, no weakness and no weight loss. Pertinent negatives for hypoglycemia complications include no blackouts, no hospitalization, no nocturnal hypoglycemia, no required assistance and no required glucagon injection. Symptoms are stable. Pertinent negatives for diabetic complications include no CVA, heart disease, impotence, nephropathy, peripheral neuropathy, PVD or retinopathy. Risk factors for coronary artery disease include dyslipidemia, family history and hypertension. Current diabetic treatment includes diet and oral agent (monotherapy). She is  compliant with treatment all of the time. Her weight is stable. She is following a generally healthy diet. Meal planning includes avoidance of concentrated sweets. She has not had a previous visit with a dietitian. She participates in exercise daily. Blood glucose monitoring compliance is adequate. Her breakfast blood glucose range is generally  mg/dl. She does not see a podiatrist.Eye exam is current.        Review of Systems   Constitutional: Negative.  Negative for fatigue and weight loss.   HENT: Negative.     Eyes: Negative.  Negative for blurred vision.   Respiratory: Negative.     Cardiovascular: Negative.  Negative for chest pain.   Gastrointestinal: Negative.    Endocrine: Negative for polydipsia, polyphagia and polyuria.   Genitourinary: Negative.  Negative for impotence.   Skin:  Negative for pallor.   Neurological: Negative.  Negative for dizziness, tremors, seizures, speech difficulty, weakness and headaches.   Hematological: Negative.    Psychiatric/Behavioral: Negative.  Negative for confusion. The patient is not nervous/anxious.        Objective   /70 (BP Location: Left arm, Patient Position: Sitting, BP Cuff Size: Adult)   Pulse 88   Temp 36.1 °C (96.9 °F) (Temporal)   Wt 59.7 kg (131 lb 9.6 oz)   SpO2 96%   BMI 23.31 kg/m²     Physical Exam  Constitutional:       Appearance: Normal appearance.   HENT:      Head: Normocephalic and atraumatic.   Eyes:      Extraocular Movements: Extraocular movements intact.      Pupils: Pupils are equal, round, and reactive to light.   Cardiovascular:      Rate and Rhythm: Normal rate and regular rhythm.      Heart sounds: Normal heart sounds.   Pulmonary:      Effort: Pulmonary effort is normal.      Breath sounds: Normal breath sounds. No wheezing or rhonchi.   Abdominal:      General: Abdomen is flat. Bowel sounds are normal. There is no distension.      Palpations: Abdomen is soft.   Musculoskeletal:         General: Normal range of motion.       Cervical back: Normal range of motion and neck supple.      Right lower leg: No edema.      Left lower leg: No edema.   Skin:     General: Skin is warm.   Neurological:      General: No focal deficit present.      Mental Status: She is alert and oriented to person, place, and time.   Psychiatric:         Mood and Affect: Mood normal.         Behavior: Behavior normal.         Assessment/Plan

## 2024-03-27 NOTE — ASSESSMENT & PLAN NOTE
Continue 1800-calorie ADA and exercise.  Continue metformin 500 mg BID,  A1C done today,improved.  1800 allison ADA diet provided to pt.  Return for physical in 4 months check lab prior to next appointment.  Will do yearly eye examination.  Continue regular walking and reinforced diet.

## 2024-06-22 DIAGNOSIS — E11.9 DIABETES MELLITUS WITHOUT COMPLICATION (MULTI): ICD-10-CM

## 2024-06-24 RX ORDER — METFORMIN HYDROCHLORIDE 500 MG/1
TABLET, EXTENDED RELEASE ORAL
Qty: 90 TABLET | Refills: 3 | Status: SHIPPED | OUTPATIENT
Start: 2024-06-24

## 2024-07-18 ENCOUNTER — LAB (OUTPATIENT)
Dept: LAB | Facility: LAB | Age: 69
End: 2024-07-18
Payer: MEDICARE

## 2024-07-18 DIAGNOSIS — D58.2 ELEVATED HEMOGLOBIN (CMS-HCC): ICD-10-CM

## 2024-07-18 DIAGNOSIS — E78.5 HYPERLIPIDEMIA, UNSPECIFIED HYPERLIPIDEMIA TYPE: ICD-10-CM

## 2024-07-18 DIAGNOSIS — E11.9 DIABETES MELLITUS WITHOUT COMPLICATION (MULTI): ICD-10-CM

## 2024-07-18 DIAGNOSIS — I10 BENIGN ESSENTIAL HYPERTENSION: ICD-10-CM

## 2024-07-18 LAB
ALBUMIN SERPL BCP-MCNC: 4.2 G/DL (ref 3.4–5)
ALP SERPL-CCNC: 86 U/L (ref 33–136)
ALT SERPL W P-5'-P-CCNC: 18 U/L (ref 7–45)
ANION GAP SERPL CALC-SCNC: 13 MMOL/L (ref 10–20)
AST SERPL W P-5'-P-CCNC: 20 U/L (ref 9–39)
BILIRUB SERPL-MCNC: 0.7 MG/DL (ref 0–1.2)
BUN SERPL-MCNC: 18 MG/DL (ref 6–23)
CALCIUM SERPL-MCNC: 9.7 MG/DL (ref 8.6–10.6)
CHLORIDE SERPL-SCNC: 102 MMOL/L (ref 98–107)
CHOLEST SERPL-MCNC: 185 MG/DL (ref 0–199)
CHOLESTEROL/HDL RATIO: 3.6
CO2 SERPL-SCNC: 29 MMOL/L (ref 21–32)
CREAT SERPL-MCNC: 0.54 MG/DL (ref 0.5–1.05)
CREAT UR-MCNC: 76.6 MG/DL (ref 20–320)
EGFRCR SERPLBLD CKD-EPI 2021: >90 ML/MIN/1.73M*2
ERYTHROCYTE [DISTWIDTH] IN BLOOD BY AUTOMATED COUNT: 12.9 % (ref 11.5–14.5)
EST. AVERAGE GLUCOSE BLD GHB EST-MCNC: 131 MG/DL
GLUCOSE SERPL-MCNC: 111 MG/DL (ref 74–99)
HBA1C MFR BLD: 6.2 %
HCT VFR BLD AUTO: 46.1 % (ref 36–46)
HDLC SERPL-MCNC: 51.4 MG/DL
HGB BLD-MCNC: 14.7 G/DL (ref 12–16)
LDLC SERPL CALC-MCNC: 82 MG/DL
MCH RBC QN AUTO: 30.8 PG (ref 26–34)
MCHC RBC AUTO-ENTMCNC: 31.9 G/DL (ref 32–36)
MCV RBC AUTO: 97 FL (ref 80–100)
MICROALBUMIN UR-MCNC: 10.3 MG/L
MICROALBUMIN/CREAT UR: 13.4 UG/MG CREAT
NON HDL CHOLESTEROL: 134 MG/DL (ref 0–149)
NRBC BLD-RTO: 0 /100 WBCS (ref 0–0)
PLATELET # BLD AUTO: 272 X10*3/UL (ref 150–450)
POTASSIUM SERPL-SCNC: 4.2 MMOL/L (ref 3.5–5.3)
PROT SERPL-MCNC: 6.7 G/DL (ref 6.4–8.2)
RBC # BLD AUTO: 4.77 X10*6/UL (ref 4–5.2)
SODIUM SERPL-SCNC: 140 MMOL/L (ref 136–145)
TRIGL SERPL-MCNC: 258 MG/DL (ref 0–149)
TSH SERPL-ACNC: 1.87 MIU/L (ref 0.44–3.98)
VLDL: 52 MG/DL (ref 0–40)
WBC # BLD AUTO: 8.1 X10*3/UL (ref 4.4–11.3)

## 2024-07-18 PROCEDURE — 85027 COMPLETE CBC AUTOMATED: CPT

## 2024-07-18 PROCEDURE — 83036 HEMOGLOBIN GLYCOSYLATED A1C: CPT

## 2024-07-18 PROCEDURE — 82570 ASSAY OF URINE CREATININE: CPT

## 2024-07-18 PROCEDURE — 80061 LIPID PANEL: CPT

## 2024-07-18 PROCEDURE — 36415 COLL VENOUS BLD VENIPUNCTURE: CPT

## 2024-07-18 PROCEDURE — 80053 COMPREHEN METABOLIC PANEL: CPT

## 2024-07-18 PROCEDURE — 84443 ASSAY THYROID STIM HORMONE: CPT

## 2024-07-18 PROCEDURE — 82043 UR ALBUMIN QUANTITATIVE: CPT

## 2024-07-23 ENCOUNTER — APPOINTMENT (OUTPATIENT)
Dept: PRIMARY CARE | Facility: CLINIC | Age: 69
End: 2024-07-23
Payer: MEDICARE

## 2024-07-23 VITALS
WEIGHT: 137 LBS | HEART RATE: 103 BPM | BODY MASS INDEX: 24.27 KG/M2 | TEMPERATURE: 98 F | OXYGEN SATURATION: 93 % | HEIGHT: 63 IN | SYSTOLIC BLOOD PRESSURE: 116 MMHG | DIASTOLIC BLOOD PRESSURE: 77 MMHG

## 2024-07-23 DIAGNOSIS — Z00.00 ROUTINE GENERAL MEDICAL EXAMINATION AT HEALTH CARE FACILITY: ICD-10-CM

## 2024-07-23 DIAGNOSIS — Z12.31 VISIT FOR SCREENING MAMMOGRAM: ICD-10-CM

## 2024-07-23 DIAGNOSIS — Z13.6 SCREENING FOR HEART DISEASE: ICD-10-CM

## 2024-07-23 DIAGNOSIS — E03.9 ACQUIRED HYPOTHYROIDISM: ICD-10-CM

## 2024-07-23 DIAGNOSIS — R00.0 RAPID HEART RATE: ICD-10-CM

## 2024-07-23 DIAGNOSIS — M85.80 OSTEOPENIA, UNSPECIFIED LOCATION: ICD-10-CM

## 2024-07-23 DIAGNOSIS — I10 BENIGN ESSENTIAL HYPERTENSION: ICD-10-CM

## 2024-07-23 DIAGNOSIS — E78.49 OTHER HYPERLIPIDEMIA: ICD-10-CM

## 2024-07-23 DIAGNOSIS — Z00.00 ANNUAL PHYSICAL EXAM: ICD-10-CM

## 2024-07-23 DIAGNOSIS — Z00.00 MEDICARE ANNUAL WELLNESS VISIT, SUBSEQUENT: Primary | ICD-10-CM

## 2024-07-23 DIAGNOSIS — R73.9 BLOOD GLUCOSE ELEVATED: ICD-10-CM

## 2024-07-23 DIAGNOSIS — D58.2 ELEVATED HEMOGLOBIN (CMS-HCC): ICD-10-CM

## 2024-07-23 PROBLEM — H43.819 POSTERIOR VITREOUS DETACHMENT: Status: ACTIVE | Noted: 2024-07-23

## 2024-07-23 PROBLEM — Z86.39 HISTORY OF ELEVATED LIPIDS: Status: ACTIVE | Noted: 2024-07-23

## 2024-07-23 PROBLEM — Z86.39 HISTORY OF HYPOTHYROIDISM: Status: ACTIVE | Noted: 2024-07-23

## 2024-07-23 PROBLEM — E11.9 DIABETES MELLITUS WITHOUT COMPLICATION (MULTI): Status: RESOLVED | Noted: 2023-12-13 | Resolved: 2024-07-23

## 2024-07-23 PROBLEM — R71.8 ELEVATED HEMATOCRIT: Status: RESOLVED | Noted: 2023-02-13 | Resolved: 2024-07-23

## 2024-07-23 PROCEDURE — G0439 PPPS, SUBSEQ VISIT: HCPCS | Performed by: INTERNAL MEDICINE

## 2024-07-23 PROCEDURE — 1159F MED LIST DOCD IN RCRD: CPT | Performed by: INTERNAL MEDICINE

## 2024-07-23 PROCEDURE — 1170F FXNL STATUS ASSESSED: CPT | Performed by: INTERNAL MEDICINE

## 2024-07-23 PROCEDURE — G0446 INTENS BEHAVE THER CARDIO DX: HCPCS | Performed by: INTERNAL MEDICINE

## 2024-07-23 PROCEDURE — 99214 OFFICE O/P EST MOD 30 MIN: CPT | Performed by: INTERNAL MEDICINE

## 2024-07-23 PROCEDURE — 93000 ELECTROCARDIOGRAM COMPLETE: CPT | Performed by: INTERNAL MEDICINE

## 2024-07-23 PROCEDURE — 3074F SYST BP LT 130 MM HG: CPT | Performed by: INTERNAL MEDICINE

## 2024-07-23 PROCEDURE — 3008F BODY MASS INDEX DOCD: CPT | Performed by: INTERNAL MEDICINE

## 2024-07-23 PROCEDURE — 3078F DIAST BP <80 MM HG: CPT | Performed by: INTERNAL MEDICINE

## 2024-07-23 PROCEDURE — 99397 PER PM REEVAL EST PAT 65+ YR: CPT | Performed by: INTERNAL MEDICINE

## 2024-07-23 PROCEDURE — 1160F RVW MEDS BY RX/DR IN RCRD: CPT | Performed by: INTERNAL MEDICINE

## 2024-07-23 PROCEDURE — 1123F ACP DISCUSS/DSCN MKR DOCD: CPT | Performed by: INTERNAL MEDICINE

## 2024-07-23 RX ORDER — METOPROLOL TARTRATE 25 MG/1
TABLET, FILM COATED ORAL
Qty: 1 TABLET | Refills: 0 | Status: SHIPPED | OUTPATIENT
Start: 2024-07-23

## 2024-07-23 ASSESSMENT — ENCOUNTER SYMPTOMS
EYES NEGATIVE: 1
GASTROINTESTINAL NEGATIVE: 1
CARDIOVASCULAR NEGATIVE: 1
RESPIRATORY NEGATIVE: 1
HEMATOLOGIC/LYMPHATIC NEGATIVE: 1
PSYCHIATRIC NEGATIVE: 1
NEUROLOGICAL NEGATIVE: 1
CONSTITUTIONAL NEGATIVE: 1

## 2024-07-23 ASSESSMENT — ACTIVITIES OF DAILY LIVING (ADL)
TAKING_MEDICATION: INDEPENDENT
GROCERY_SHOPPING: INDEPENDENT
BATHING: INDEPENDENT
DOING_HOUSEWORK: INDEPENDENT
MANAGING_FINANCES: INDEPENDENT
DRESSING: INDEPENDENT

## 2024-07-23 ASSESSMENT — PATIENT HEALTH QUESTIONNAIRE - PHQ9
2. FEELING DOWN, DEPRESSED OR HOPELESS: NOT AT ALL
1. LITTLE INTEREST OR PLEASURE IN DOING THINGS: NOT AT ALL
SUM OF ALL RESPONSES TO PHQ9 QUESTIONS 1 AND 2: 0

## 2024-07-23 NOTE — PROGRESS NOTES
"kgSubjective   Patient ID: Leny Dumont is a 68 y.o. female who presents for Medicare Annual Wellness Visit Subsequent (Patient is here for an annual Medicare wellness visit, physical, and follow up. Patient will bring living will and healthcare POA to next appointment. ), Annual Exam, and Follow-up.    Here for MCR and to F/u on medical problems HTN,HLD,hypothyroid,elevated glucose ,elevated hemoglobin(seen in past by hematologist Dr Montes,told to monitor and had work up),she takes meds regularly,no side effects, she is tolerating metformin well.  S she continued to walk her dog regularly she noted she has been urinating less frequent as before or prior to starting metformin.  Eye exam within 1 year she was told she had cataract but not bad enough to be operated on.  Her to go over her lab results.  She complains of left hip pain she is known to have advanced arthritis in her hip.  Last Cologuard 6/1/2024.  Last mammogram 11/8/2023.  Last DEXA scan 11/8/2023  She had h/o  left ankle trimalleolar Fx in past in 2021,s/p surgery.still has pain at times depend on the weather.  She has a living will and advanced directive and her daughter is POA.         Review of Systems   Constitutional: Negative.    HENT: Negative.          She has some congestion from weather and barometer changes   Eyes: Negative.    Respiratory: Negative.     Cardiovascular: Negative.    Gastrointestinal: Negative.    Genitourinary: Negative.    Musculoskeletal:         Left hip pain.   Neurological: Negative.    Hematological: Negative.    Psychiatric/Behavioral: Negative.         Objective   /77 (BP Location: Left arm, Patient Position: Sitting, BP Cuff Size: Adult)   Pulse 103   Temp 36.7 °C (98 °F) (Temporal)   Ht 1.6 m (5' 3\")   Wt 62.1 kg (137 lb)   SpO2 93%   BMI 24.27 kg/m²     Physical Exam  Constitutional:       General: She is not in acute distress.     Appearance: Normal appearance.   HENT:      Head: Normocephalic and " atraumatic.      Right Ear: Tympanic membrane, ear canal and external ear normal.      Left Ear: Tympanic membrane, ear canal and external ear normal.      Mouth/Throat:      Mouth: Mucous membranes are moist.      Pharynx: No oropharyngeal exudate.   Eyes:      Extraocular Movements: Extraocular movements intact.      Pupils: Pupils are equal, round, and reactive to light.   Neck:      Vascular: No carotid bruit.   Cardiovascular:      Rate and Rhythm: Normal rate and regular rhythm.      Heart sounds: Normal heart sounds. No murmur heard.  Pulmonary:      Effort: Pulmonary effort is normal.      Breath sounds: Normal breath sounds. No wheezing or rhonchi.   Chest:   Breasts:     Right: Normal.      Left: Normal.   Abdominal:      General: Abdomen is flat. Bowel sounds are normal. There is no distension.      Palpations: Abdomen is soft.   Musculoskeletal:         General: Normal range of motion.      Cervical back: Normal range of motion and neck supple.      Right lower leg: No edema.      Left lower leg: No edema.      Comments: Healed scar left ankle,limited ROM.  Limited range of motion left hip.   Lymphadenopathy:      Cervical: No cervical adenopathy.      Upper Body:      Right upper body: No supraclavicular or axillary adenopathy.      Left upper body: No supraclavicular or axillary adenopathy.   Skin:     General: Skin is warm.   Neurological:      General: No focal deficit present.      Mental Status: She is alert and oriented to person, place, and time.   Psychiatric:         Mood and Affect: Mood normal.         Behavior: Behavior normal.         Assessment/Plan   Problem List Items Addressed This Visit             ICD-10-CM    Benign essential hypertension I10     Stable on current med.         Relevant Orders    ECG 12 lead (Clinic Performed) (Completed)    Elevated hemoglobin (CMS-HCC) D58.2     Seen by hematologist Dr Montes  in past and had workup,told to monitor.         Hyperlipidemia E78.5      15 minutes were spent in discussing her cardiovascular risk and behavior therapies of nutritional choices,exercise, and elimination of habits contributing to risk. we agreed on a plan how they may be able to reduce their current cardiovascular risk. For patient with risk calculation > 10 %, Aspirin use was discussed and encouraged unless known allergy or increased risk of bleeding contraindicated use. Patient 10 year CV risk estimate calculates: 19.1 %.  Will send patient for cardiac calcium score to screen for CAD.         Hypothyroidism E03.9     Stable on Levothyroxine.         Osteopenia M85.80    Medicare annual wellness visit, subsequent - Primary Z00.00    Annual physical exam Z00.00     Complete physical examination completed today.  Advised to keep a heart healthy, low-fat diet as recommended diet is the Mediterranean diet.  Advised to exercise regularly for 30 minutes daily 5 days a week and maintain 150 minutes of exercise per week.  Discussed age-appropriate cancer screening,Immunization and recommendation were given.  Advised on regular eye and dental visit.  Advised on staying well-hydrated.         Blood glucose elevated R73.9     Follow carbohydrate diet.         Screening for heart disease Z13.6    Relevant Orders    CT cardiac scoring wo IV contrast    Rapid heart rate R00.0    Relevant Medications    metoprolol tartrate (Lopressor) 25 mg tablet    Visit for screening mammogram Z12.31    Relevant Orders    BI mammo bilateral screening tomosynthesis     Other Visit Diagnoses         Codes    Routine general medical examination at health care facility     Z00.00

## 2024-07-26 NOTE — ASSESSMENT & PLAN NOTE
15 minutes were spent in discussing her cardiovascular risk and behavior therapies of nutritional choices,exercise, and elimination of habits contributing to risk. we agreed on a plan how they may be able to reduce their current cardiovascular risk. For patient with risk calculation > 10 %, Aspirin use was discussed and encouraged unless known allergy or increased risk of bleeding contraindicated use. Patient 10 year CV risk estimate calculates: 19.1 %.  Will send patient for cardiac calcium score to screen for CAD.

## 2024-11-11 ENCOUNTER — HOSPITAL ENCOUNTER (OUTPATIENT)
Dept: RADIOLOGY | Facility: CLINIC | Age: 69
Discharge: HOME | End: 2024-11-11
Payer: MEDICARE

## 2024-11-11 VITALS — HEIGHT: 63 IN | BODY MASS INDEX: 24.26 KG/M2 | WEIGHT: 136.91 LBS

## 2024-11-11 DIAGNOSIS — Z12.31 VISIT FOR SCREENING MAMMOGRAM: ICD-10-CM

## 2024-11-11 PROCEDURE — 77067 SCR MAMMO BI INCL CAD: CPT

## 2024-11-11 PROCEDURE — 77067 SCR MAMMO BI INCL CAD: CPT | Performed by: RADIOLOGY

## 2024-11-11 PROCEDURE — 77063 BREAST TOMOSYNTHESIS BI: CPT | Performed by: RADIOLOGY

## 2025-02-06 DIAGNOSIS — E78.5 HYPERLIPIDEMIA, UNSPECIFIED HYPERLIPIDEMIA TYPE: ICD-10-CM

## 2025-02-06 RX ORDER — ATORVASTATIN CALCIUM 20 MG/1
20 TABLET, FILM COATED ORAL DAILY
Qty: 90 TABLET | Refills: 3 | Status: SHIPPED | OUTPATIENT
Start: 2025-02-06

## 2025-03-30 DIAGNOSIS — I10 BENIGN ESSENTIAL HYPERTENSION: ICD-10-CM

## 2025-03-31 ENCOUNTER — PATIENT OUTREACH (OUTPATIENT)
Dept: PRIMARY CARE | Facility: CLINIC | Age: 70
End: 2025-03-31
Payer: MEDICARE

## 2025-03-31 RX ORDER — LISINOPRIL 20 MG/1
20 TABLET ORAL DAILY
Qty: 90 TABLET | Refills: 3 | Status: SHIPPED | OUTPATIENT
Start: 2025-03-31

## 2025-03-31 RX ORDER — ASPIRIN 81 MG/1
81 TABLET ORAL 2 TIMES DAILY
COMMUNITY

## 2025-03-31 RX ORDER — OXYCODONE HYDROCHLORIDE 5 MG/1
5 TABLET ORAL EVERY 6 HOURS PRN
COMMUNITY

## 2025-03-31 RX ORDER — AMOXICILLIN 250 MG
1 CAPSULE ORAL 2 TIMES DAILY
COMMUNITY

## 2025-03-31 RX ORDER — NITROFURANTOIN 25; 75 MG/1; MG/1
100 CAPSULE ORAL 2 TIMES DAILY
COMMUNITY

## 2025-04-01 ENCOUNTER — TELEPHONE (OUTPATIENT)
Dept: PRIMARY CARE | Facility: CLINIC | Age: 70
End: 2025-04-01
Payer: MEDICARE

## 2025-04-01 NOTE — TELEPHONE ENCOUNTER
Patient advised home health care nurses, that she does not want to start home care until after 4/4/25.   When services are after 48 hours, they were advised to notify drZurdo Also, when the start of care begins, they will also notify DrZurdo Of what services patient will receive, as well as the frequency of visits. FYI.

## 2025-04-25 ENCOUNTER — TELEPHONE (OUTPATIENT)
Dept: PRIMARY CARE | Facility: CLINIC | Age: 70
End: 2025-04-25
Payer: MEDICARE

## 2025-04-25 NOTE — TELEPHONE ENCOUNTER
[facility] - requesting an order for outpatient PT - pt would like to go to [facility]

## 2025-05-01 ENCOUNTER — PATIENT OUTREACH (OUTPATIENT)
Dept: PRIMARY CARE | Facility: CLINIC | Age: 70
End: 2025-05-01
Payer: MEDICARE

## 2025-05-01 DIAGNOSIS — I10 BENIGN ESSENTIAL HYPERTENSION: ICD-10-CM

## 2025-05-01 DIAGNOSIS — E78.5 HYPERLIPIDEMIA, UNSPECIFIED HYPERLIPIDEMIA TYPE: ICD-10-CM

## 2025-05-01 RX ORDER — HYDROCHLOROTHIAZIDE 50 MG/1
50 TABLET ORAL DAILY
Qty: 90 TABLET | Refills: 3 | Status: SHIPPED | OUTPATIENT
Start: 2025-05-01

## 2025-05-01 RX ORDER — LEVOTHYROXINE SODIUM 75 UG/1
75 TABLET ORAL
Qty: 90 TABLET | Refills: 3 | Status: SHIPPED | OUTPATIENT
Start: 2025-05-01

## 2025-05-01 NOTE — PROGRESS NOTES
Unable to reach patient for follow up call after recent hospitalization.   Left voicemail with call back number for patient to call if needed

## 2025-05-23 ENCOUNTER — TELEPHONE (OUTPATIENT)
Dept: PRIMARY CARE | Facility: CLINIC | Age: 70
End: 2025-05-23
Payer: MEDICARE

## 2025-07-09 DIAGNOSIS — E11.9 DIABETES MELLITUS WITHOUT COMPLICATION: ICD-10-CM

## 2025-07-10 RX ORDER — METFORMIN HYDROCHLORIDE 500 MG/1
TABLET, EXTENDED RELEASE ORAL
Qty: 90 TABLET | Refills: 3 | Status: SHIPPED | OUTPATIENT
Start: 2025-07-10

## 2025-07-16 ENCOUNTER — TELEPHONE (OUTPATIENT)
Dept: PRIMARY CARE | Facility: CLINIC | Age: 70
End: 2025-07-16
Payer: MEDICARE

## 2025-07-16 DIAGNOSIS — E03.9 ACQUIRED HYPOTHYROIDISM: ICD-10-CM

## 2025-07-16 DIAGNOSIS — E78.49 OTHER HYPERLIPIDEMIA: ICD-10-CM

## 2025-07-16 DIAGNOSIS — I10 BENIGN ESSENTIAL HYPERTENSION: Primary | ICD-10-CM

## 2025-07-16 DIAGNOSIS — E55.9 VITAMIN D DEFICIENCY: ICD-10-CM

## 2025-07-16 NOTE — TELEPHONE ENCOUNTER
Pt called in regards to labs that she needs for her upcoming appt for her [facility] on 7/23. Please advise pt is coming in on Friday to do labs. Thank you!

## 2025-07-19 LAB
25(OH)D3+25(OH)D2 SERPL-MCNC: 25 NG/ML (ref 30–100)
ALBUMIN SERPL-MCNC: 4.7 G/DL (ref 3.6–5.1)
ALP SERPL-CCNC: 99 U/L (ref 37–153)
ALT SERPL-CCNC: 31 U/L (ref 6–29)
ANION GAP SERPL CALCULATED.4IONS-SCNC: 10 MMOL/L (CALC) (ref 7–17)
AST SERPL-CCNC: 29 U/L (ref 10–35)
BILIRUB SERPL-MCNC: 0.8 MG/DL (ref 0.2–1.2)
BUN SERPL-MCNC: 25 MG/DL (ref 7–25)
CALCIUM SERPL-MCNC: 10.2 MG/DL (ref 8.6–10.4)
CHLORIDE SERPL-SCNC: 97 MMOL/L (ref 98–110)
CHOLEST SERPL-MCNC: 228 MG/DL
CHOLEST/HDLC SERPL: 4 (CALC)
CO2 SERPL-SCNC: 30 MMOL/L (ref 20–32)
CREAT SERPL-MCNC: 0.6 MG/DL (ref 0.6–1)
EGFRCR SERPLBLD CKD-EPI 2021: 96 ML/MIN/1.73M2
ERYTHROCYTE [DISTWIDTH] IN BLOOD BY AUTOMATED COUNT: 13.6 % (ref 11–15)
GLUCOSE SERPL-MCNC: 98 MG/DL (ref 65–99)
HCT VFR BLD AUTO: 47.2 % (ref 35–45)
HDLC SERPL-MCNC: 57 MG/DL
HGB BLD-MCNC: 15.3 G/DL (ref 11.7–15.5)
LDLC SERPL CALC-MCNC: 118 MG/DL (CALC)
MCH RBC QN AUTO: 30.4 PG (ref 27–33)
MCHC RBC AUTO-ENTMCNC: 32.4 G/DL (ref 32–36)
MCV RBC AUTO: 93.8 FL (ref 80–100)
NONHDLC SERPL-MCNC: 171 MG/DL (CALC)
PLATELET # BLD AUTO: 313 THOUSAND/UL (ref 140–400)
PMV BLD REES-ECKER: 10.4 FL (ref 7.5–12.5)
POTASSIUM SERPL-SCNC: 3.7 MMOL/L (ref 3.5–5.3)
PROT SERPL-MCNC: 7.2 G/DL (ref 6.1–8.1)
RBC # BLD AUTO: 5.03 MILLION/UL (ref 3.8–5.1)
SODIUM SERPL-SCNC: 137 MMOL/L (ref 135–146)
TRIGL SERPL-MCNC: 368 MG/DL
TSH SERPL-ACNC: 0.65 MIU/L (ref 0.4–4.5)
WBC # BLD AUTO: 7.7 THOUSAND/UL (ref 3.8–10.8)

## 2025-07-23 ENCOUNTER — APPOINTMENT (OUTPATIENT)
Dept: PRIMARY CARE | Facility: CLINIC | Age: 70
End: 2025-07-23
Payer: MEDICARE

## 2025-07-23 VITALS
TEMPERATURE: 98 F | DIASTOLIC BLOOD PRESSURE: 70 MMHG | BODY MASS INDEX: 23.71 KG/M2 | SYSTOLIC BLOOD PRESSURE: 102 MMHG | OXYGEN SATURATION: 94 % | WEIGHT: 133.8 LBS | HEART RATE: 98 BPM | HEIGHT: 63 IN

## 2025-07-23 DIAGNOSIS — E55.9 VITAMIN D DEFICIENCY: ICD-10-CM

## 2025-07-23 DIAGNOSIS — I10 BENIGN ESSENTIAL HYPERTENSION: ICD-10-CM

## 2025-07-23 DIAGNOSIS — Z00.00 MEDICARE ANNUAL WELLNESS VISIT, SUBSEQUENT: Primary | ICD-10-CM

## 2025-07-23 DIAGNOSIS — E78.5 HYPERLIPIDEMIA, UNSPECIFIED HYPERLIPIDEMIA TYPE: ICD-10-CM

## 2025-07-23 DIAGNOSIS — E03.9 ACQUIRED HYPOTHYROIDISM: ICD-10-CM

## 2025-07-23 DIAGNOSIS — D58.2 ELEVATED HEMOGLOBIN: ICD-10-CM

## 2025-07-23 DIAGNOSIS — E78.49 OTHER HYPERLIPIDEMIA: ICD-10-CM

## 2025-07-23 DIAGNOSIS — Z78.0 ASYMPTOMATIC MENOPAUSE: ICD-10-CM

## 2025-07-23 DIAGNOSIS — Z12.31 ENCOUNTER FOR SCREENING MAMMOGRAM FOR BREAST CANCER: ICD-10-CM

## 2025-07-23 DIAGNOSIS — Z00.00 ROUTINE GENERAL MEDICAL EXAMINATION AT HEALTH CARE FACILITY: ICD-10-CM

## 2025-07-23 DIAGNOSIS — Z00.00 ANNUAL PHYSICAL EXAM: ICD-10-CM

## 2025-07-23 DIAGNOSIS — R73.9 BLOOD GLUCOSE ELEVATED: ICD-10-CM

## 2025-07-23 ASSESSMENT — ACTIVITIES OF DAILY LIVING (ADL)
DRESSING: INDEPENDENT
GROCERY_SHOPPING: INDEPENDENT
BATHING: INDEPENDENT
TAKING_MEDICATION: INDEPENDENT
MANAGING_FINANCES: INDEPENDENT
DOING_HOUSEWORK: INDEPENDENT

## 2025-07-23 ASSESSMENT — PATIENT HEALTH QUESTIONNAIRE - PHQ9
1. LITTLE INTEREST OR PLEASURE IN DOING THINGS: NOT AT ALL
SUM OF ALL RESPONSES TO PHQ9 QUESTIONS 1 AND 2: 0
2. FEELING DOWN, DEPRESSED OR HOPELESS: NOT AT ALL

## 2025-07-23 NOTE — PROGRESS NOTES
"Subjective   Reason for Visit: Leny Dumont is an 69 y.o. female here for a Medicare Wellness visit.     Past Medical, Surgical, and Family History reviewed and updated in chart.         Here for MCR,CPE  and to F/u on medical problems HTN,HLD,hypothyroid,elevated glucose ,elevated hemoglobin(seen in past by hematologist Dr Montes,told to monitor and had work up),she takes meds regularly,no side effects, she is tolerating metformin well.    She fell recently passed while walking her dog and the raccoon appeared in front of the dog and caused the patient to fell on right hip, sustained fracture which required surgical repair, she did physical therapy she still has used cane for ambulation otherwise she has been limping.  Eye exam within 1 year she was told she had cataract but not bad enough to be operated on.  Her to go over her lab results.  She complains of left hip pain she is known to have advanced arthritis in her hip.  Last Cologuard 6/6/2024. Negative   Last mammogram 11/8/2023.  Last DEXA scan 11/8/2023  She had h/o  left ankle trimalleolar Fx in past in 2021,s/p surgery.still has pain at times depend on the weather.  She has a living will and advanced directive and her daughter is POA.        Patient Care Team:  Neva Tang MD as PCP - General  Neva Tang MD as PCP - [employer] PCP     Review of Systems   Constitutional: Negative.    HENT: Negative.     Eyes: Negative.    Respiratory: Negative.     Cardiovascular: Negative.    Gastrointestinal: Negative.    Genitourinary: Negative.    Musculoskeletal:         As HPI   Neurological: Negative.    Hematological: Negative.    Psychiatric/Behavioral: Negative.         Objective   Vitals:  /70 (BP Location: Left arm, Patient Position: Sitting, BP Cuff Size: Adult)   Pulse 98   Temp 36.7 °C (98 °F) (Temporal)   Ht 1.59 m (5' 2.6\")   Wt 60.7 kg (133 lb 12.8 oz)   SpO2 94%   BMI 24.01 kg/m²       Physical Exam  Vitals " reviewed.   Constitutional:       General: She is not in acute distress.     Appearance: Normal appearance. She is normal weight. She is not toxic-appearing or diaphoretic.   HENT:      Head: Normocephalic and atraumatic.      Right Ear: Tympanic membrane, ear canal and external ear normal. There is no impacted cerumen.      Left Ear: Tympanic membrane, ear canal and external ear normal. There is no impacted cerumen.      Nose: Nose normal.      Mouth/Throat:      Mouth: Mucous membranes are moist.      Pharynx: No oropharyngeal exudate or posterior oropharyngeal erythema.     Eyes:      Extraocular Movements: Extraocular movements intact.      Pupils: Pupils are equal, round, and reactive to light.     Neck:      Vascular: No carotid bruit.     Cardiovascular:      Rate and Rhythm: Normal rate and regular rhythm.      Pulses: Normal pulses.      Heart sounds: Normal heart sounds. No murmur heard.     Comments: Heart rate 96  Pulmonary:      Effort: Pulmonary effort is normal. No respiratory distress.      Breath sounds: Normal breath sounds. No wheezing.   Chest:   Breasts:     Right: Normal. No mass or tenderness.      Left: Normal. No mass or tenderness.   Abdominal:      General: Abdomen is flat. Bowel sounds are normal. There is no distension.      Palpations: Abdomen is soft.      Tenderness: There is no abdominal tenderness. There is no right CVA tenderness, left CVA tenderness or guarding.     Musculoskeletal:      Cervical back: Normal range of motion and neck supple.      Right lower leg: No edema.      Left lower leg: No edema.      Comments: Using her cane for ambulation.   Lymphadenopathy:      Cervical: No cervical adenopathy.      Upper Body:      Right upper body: No supraclavicular or axillary adenopathy.      Left upper body: No supraclavicular or axillary adenopathy.     Neurological:      General: No focal deficit present.      Mental Status: She is alert and oriented to person, place, and time.       Cranial Nerves: No cranial nerve deficit.      Sensory: No sensory deficit.      Comments: Using a cane for ambulation   Psychiatric:         Mood and Affect: Mood normal.         Assessment & Plan  Medicare annual wellness visit, subsequent         Annual physical exam  Complete physical examination completed today.  Advised to keep a heart healthy, low-fat diet as recommended diet is the Mediterranean diet.  Advised to exercise regularly for 30 minutes daily 5 days a week and maintain 150 minutes of exercise per week.  Discussed age-appropriate cancer screening,Immunization and recommendation were given.  Advised on regular eye and dental visit.  Advised on staying well-hydrated.         Benign essential hypertension  Stable on current med.    Orders:    ECG 12 lead (Clinic Performed)    Blood glucose elevated  Follow carbohydrate diet.        Other hyperlipidemia  15 minutes were spent in discussing her cardiovascular risk and behavior therapies of nutritional choices,exercise, and elimination of habits contributing to risk. we agreed on a plan how they may be able to reduce their current cardiovascular risk. For patient with risk calculation > 10 %, Aspirin use was discussed and encouraged unless known allergy or increased risk of bleeding contraindicated use. Patient 10 year CV risk estimate calculates: 17.3 %.  Will send patient for cardiac calcium score to screen for CAD.  She did not go last year for that test.         Acquired hypothyroidism  Stable on Levothyroxine.         Routine general medical examination at health care facility    Orders:    1 Year Follow Up In [facility] - PCP - Wellness Exam; Future    Vitamin D deficiency    Orders:    Vitamin D 25-Hydroxy,Total (for eval of Vitamin D levels); Future    Hyperlipidemia, unspecified hyperlipidemia type      Orders:    Lipid Panel; Future    Encounter for screening mammogram for breast cancer    Orders:    BI mammo bilateral screening  tomosynthesis; Future    Asymptomatic menopause    Orders:    XR DEXA bone density; Future    Elevated hemoglobin

## 2025-07-23 NOTE — ASSESSMENT & PLAN NOTE
15 minutes were spent in discussing her cardiovascular risk and behavior therapies of nutritional choices,exercise, and elimination of habits contributing to risk. we agreed on a plan how they may be able to reduce their current cardiovascular risk. For patient with risk calculation > 10 %, Aspirin use was discussed and encouraged unless known allergy or increased risk of bleeding contraindicated use. Patient 10 year CV risk estimate calculates: 17.3 %.  Will send patient for cardiac calcium score to screen for CAD.  She did not go last year for that test.

## 2025-07-25 ASSESSMENT — ENCOUNTER SYMPTOMS
CARDIOVASCULAR NEGATIVE: 1
HEMATOLOGIC/LYMPHATIC NEGATIVE: 1
EYES NEGATIVE: 1
PSYCHIATRIC NEGATIVE: 1
GASTROINTESTINAL NEGATIVE: 1
NEUROLOGICAL NEGATIVE: 1
CONSTITUTIONAL NEGATIVE: 1
RESPIRATORY NEGATIVE: 1

## 2025-11-18 ENCOUNTER — APPOINTMENT (OUTPATIENT)
Dept: PRIMARY CARE | Facility: CLINIC | Age: 70
End: 2025-11-18
Payer: MEDICARE

## 2026-07-21 ENCOUNTER — APPOINTMENT (OUTPATIENT)
Dept: PRIMARY CARE | Facility: CLINIC | Age: 71
End: 2026-07-21
Payer: MEDICARE